# Patient Record
Sex: MALE | Race: BLACK OR AFRICAN AMERICAN | NOT HISPANIC OR LATINO | URBAN - METROPOLITAN AREA
[De-identification: names, ages, dates, MRNs, and addresses within clinical notes are randomized per-mention and may not be internally consistent; named-entity substitution may affect disease eponyms.]

---

## 2024-01-12 ENCOUNTER — EMERGENCY (EMERGENCY)
Facility: HOSPITAL | Age: 34
LOS: 1 days | Discharge: HOME | End: 2024-01-12
Attending: STUDENT IN AN ORGANIZED HEALTH CARE EDUCATION/TRAINING PROGRAM
Payer: SELF-PAY

## 2024-01-12 VITALS
OXYGEN SATURATION: 98 % | RESPIRATION RATE: 20 BRPM | TEMPERATURE: 98 F | DIASTOLIC BLOOD PRESSURE: 70 MMHG | WEIGHT: 140.21 LBS | SYSTOLIC BLOOD PRESSURE: 116 MMHG | HEART RATE: 72 BPM

## 2024-01-12 PROCEDURE — 99283 EMERGENCY DEPT VISIT LOW MDM: CPT

## 2024-01-12 PROCEDURE — 99053 MED SERV 10PM-8AM 24 HR FAC: CPT

## 2024-01-12 RX ORDER — ACETAMINOPHEN 500 MG
2 TABLET ORAL
Qty: 84 | Refills: 0
Start: 2024-01-12 | End: 2024-01-25

## 2024-01-12 RX ORDER — ACETAMINOPHEN 500 MG
975 TABLET ORAL ONCE
Refills: 0 | Status: COMPLETED | OUTPATIENT
Start: 2024-01-12 | End: 2024-01-12

## 2024-01-12 RX ADMIN — Medication 975 MILLIGRAM(S): at 05:58

## 2024-01-12 RX ADMIN — Medication 300 MILLIGRAM(S): at 05:58

## 2024-01-12 NOTE — ED PROVIDER NOTE - CLINICAL SUMMARY MEDICAL DECISION MAKING FREE TEXT BOX
32 y/o male with pmhx asthma presents to ED for dental pain x 1 week. Patient states pain is to the right side lower gums. Upon exam, male in no acute distress with tenderness to the right lower gum area, no abscess noted to the gums. Tolerating PO without difficulty, able to swallow without difficulty, afebrile. While in ED, patient given abx and tylenol. PCN allergy. Clindamycin sent to preferred pharmacy. 32 y/o male with pmhx asthma presents to ED for dental pain x 1 week. Patient states pain is to the right side lower gums. Upon exam, male in no acute distress with tenderness to the right lower gum area, no abscess noted to the gums. Tolerating PO without difficulty, able to swallow without difficulty, afebrile. While in ED, patient given abx and tylenol. PCN allergy. Clindamycin sent to preferred pharmacy. Patient recommended to follow up with dental clinic. Pt re-assessed at this time, feeling well, noting improvement in symptoms. VSS, tolerating PO intake, ambulating in ED with steady gait. All questions answered. Return precautions given. Stable for dc. Case and discharge discussed with Attending.

## 2024-01-12 NOTE — ED PROVIDER NOTE - OBJECTIVE STATEMENT
34 y/o male with pmhx asthma presents to ED for dental pain x 1 week. Patient states pain is to the right side lower gums. States there was minimal purulent discharge from the area this past week. No pain medications taken prior to arrival. Has not seen a dentist for this. No bleeding, no fever. Tolerating PO. He denies any difficulty swallowing, no CP/SOB, no throat pain, no ear pain, no vision changes, no headache, no dizziness, no nausea/vomiting. 32 y/o male with pmhx asthma presents to ED for dental pain x 1 week. Patient states pain is to the right side lower gums. States there was minimal purulent discharge from the area this past week. No pain medications taken prior to arrival. Has not seen a dentist for this. No bleeding, no fever. Tolerating PO. He denies any difficulty swallowing, no CP/SOB, no throat pain, no ear pain, no vision changes, no headache, no dizziness, no nausea/vomiting.

## 2024-01-12 NOTE — ED PROVIDER NOTE - PATIENT PORTAL LINK FT
You can access the FollowMyHealth Patient Portal offered by [facility] by registering at the following website: [email or web address]. By joining [employer]’s FollowMyHealth portal, you will also be able to view your health information using other applications (apps) compatible with our system. You can access the FollowMyHealth Patient Portal offered by [facility] by registering at the following website: [email or web address]. By joining [employer]’s FollowMyHealth portal, you will also be able to view your health information using other applications (apps) compatible with our system.

## 2024-01-12 NOTE — ED PROVIDER NOTE - NSFOLLOWUPCLINICS_GEN_ALL_ED_FT
[facility]  Dental  [address] [place] [address]  Phone: [phone]  Fax:      [facility]  Dental  [address] [place] [id number]  Phone: [phone]  Fax:

## 2024-01-12 NOTE — ED PROVIDER NOTE - ATTENDING APP SHARED VISIT CONTRIBUTION OF CARE
33y M w/ hx asthma, presents for dental pain. No fever. On exam with tenderness and gingival swelling to R lower molar area (s/p extraction). No sign of abscess, airway intact. Will cover for possible dental infection with clindamycin. Stable for discharge with outpatient dental f/u.

## 2024-01-12 NOTE — ED PROVIDER NOTE - NSFOLLOWUPINSTRUCTIONS_ED_ALL_ED_FT
Dental Pain    Dental pain (toothache) may be caused by many things including tooth decay (cavities or caries), abscess or infection, or trauma. If you were prescribed an antibiotic medicine, finish all of it even if you start to feel better. Rinsing your mouth with salt water or applying ice to the painful area of your face may help with the pain. Follow up with a dentist is important in ensuring good oral health and preventing the worsening of dental disease.    SEEK IMMEDIATE MEDICAL CARE IF YOU HAVE ANY OF THE FOLLOWING SYMPTOMS: unable to open your mouth, trouble breathing or swallowing, fever, or swelling of the face, neck, or jaw.    Please follow up with dental clinic within 3 days   Please take antibiotics as directed   Tylenol for pain

## 2024-01-12 NOTE — ED PROVIDER NOTE - PHYSICAL EXAMINATION
Gen: No acute distress, non toxic appearing male   HENT: NC/AT, Mucous membranes moist, Oropharynx without exudates, uvula midline, throat non-erythematous   Dental: (+) Tender to right lower dentition, No lesions to mouth/throat, Teeth inspected, poor dentition. No abscess noted to gums, no swelling or bleeding. No drooling or difficulty swallowing. Tolerating oral secretions.  Eyes: pink conjunctivae, EOMI, PERRL  CV: RRR, nl s1/s2  Resp: CTAB, normal rate and effort  Neuro: A&O x 3, ensorimotor intact without deficits   MSK: No spine or joint tenderness to palpation, Full ROM ext x 4  Skin: No rashes. intact and perfused  Ambulatory   Calm and cooperative

## 2024-01-12 NOTE — ED ADULT NURSE NOTE - NSFALLUNIVINTERV_ED_ALL_ED
Bed/Stretcher in lowest position, wheels locked, appropriate side rails in place/Call bell, personal items and telephone in reach/Instruct patient to call for assistance before getting out of bed/chair/stretcher/Non-slip footwear applied when patient is off stretcher/[place] to call system/Physically safe environment - no spills, clutter or unnecessary equipment/Purposeful proactive rounding/Room/bathroom lighting operational, light cord in reach Bed/Stretcher in lowest position, wheels locked, appropriate side rails in place/Call bell, personal items and telephone in reach/Instruct patient to call for assistance before getting out of bed/chair/stretcher/Non-slip footwear applied when patient is off stretcher/[place] to call system/Physically safe environment - no spills, clutter or unnecessary equipment/Purposeful proactive rounding/Room/bathroom lighting operational, light cord in reach

## 2024-01-12 NOTE — ED PROVIDER NOTE - NS ED ROS FT
Gen: denies fever, chills  HEENT: (+) Dental pain, denies visual changes, ear pain, nasal congestion, throat pain  Respiratory: denies COLMENARES, SOB, cough  Cardiovascular: denies chest pain, palpitations  GI: denies abdominal pain, no n/v/d  MSK: denies joint swelling/pain, back pain, neck pain  Neuro: denies headache, dizziness, weakness, numbness

## 2024-01-19 ENCOUNTER — EMERGENCY (EMERGENCY)
Facility: HOSPITAL | Age: 34
LOS: 1 days | Discharge: HOME | End: 2024-01-19
Attending: STUDENT IN AN ORGANIZED HEALTH CARE EDUCATION/TRAINING PROGRAM
Payer: SELF-PAY

## 2024-01-19 VITALS
SYSTOLIC BLOOD PRESSURE: 136 MMHG | OXYGEN SATURATION: 95 % | WEIGHT: 169.98 LBS | DIASTOLIC BLOOD PRESSURE: 92 MMHG | HEART RATE: 95 BPM | TEMPERATURE: 98 F | RESPIRATION RATE: 18 BRPM

## 2024-01-19 PROBLEM — J45.909 UNSPECIFIED ASTHMA, UNCOMPLICATED: Chronic | Status: ACTIVE | Noted: 2024-01-12

## 2024-01-19 PROCEDURE — 99053 MED SERV 10PM-8AM 24 HR FAC: CPT

## 2024-01-19 PROCEDURE — 99284 EMERGENCY DEPT VISIT MOD MDM: CPT

## 2024-01-19 PROCEDURE — 99283 EMERGENCY DEPT VISIT LOW MDM: CPT

## 2024-01-19 RX ORDER — HYDROXYZINE HCL 10 MG
50 TABLET ORAL ONCE
Refills: 0 | Status: COMPLETED | OUTPATIENT
Start: 2024-01-19 | End: 2024-01-19

## 2024-01-19 RX ADMIN — Medication 50 MILLIGRAM(S): at 04:46

## 2024-01-19 NOTE — ED PROVIDER NOTE - PATIENT PORTAL LINK FT
You can access the FollowMyHealth Patient Portal offered by [facility] by registering at the following website: [email or web address]. By joining [employer]’s FollowMyHealth portal, you will also be able to view your health information using other applications (apps) compatible with our system.

## 2024-01-19 NOTE — ED PROVIDER NOTE - CLINICAL SUMMARY MEDICAL DECISION MAKING FREE TEXT BOX
33 year old male w/PMHx of asthma presents to the ED with anxiety. Currently w/o complaints apart from feeling anxious. Vitals stable. Patient appears well, alert and oriented. Examination benign. Lungs clear bilaterally. Will give hydroxyzine for anxiety management.

## 2024-01-19 NOTE — ED PROVIDER NOTE - NSFOLLOWUPINSTRUCTIONS_ED_ALL_ED_FT
Anxiety    Generalized anxiety disorder (ASIA) is a mental disorder. It is defined as anxiety that is not necessarily related to specific events or activities or is out of proportion to said events. Symptoms include restlessness, fatigue, difficulty concentrations, irritability and difficulty concentrating. It may interfere with life functions, including relationships, work, and school. If you were started on a medication, make sure to take exactly as prescribed and follow up with a psychiatrist.    SEEK IMMEDIATE MEDICAL CARE IF YOU HAVE ANY OF THE FOLLOWING SYMPTOMS: thoughts about hurting killing yourself, thoughts about hurting or killing somebody else, hallucinations, or worsening depression.

## 2024-01-19 NOTE — ED PROVIDER NOTE - ATTENDING CONTRIBUTION TO CARE
33y M w/ hx asthma presents for anxiety. Pt says he smoked marijuana earlier tonight and thinks it might have been laced. Denies chest pain or SOB. No SI, HI, hallucinations. On exam, pt in no distress, heart RRR, lungs CTAB. AAOx4, no neuro deficits. Pt reassured. Treated with hydroxyzine. Medically stable for discharge.

## 2024-01-19 NOTE — ED ADULT NURSE NOTE - TEMPLATE LIST FOR HEAD TO TOE ASSESSMENT
General This was a shared visit with the DEIDRE. I reviewed and verified the documentation and independently performed the documented:

## 2024-01-19 NOTE — ED ADULT NURSE NOTE - NSFALLUNIVINTERV_ED_ALL_ED
Bed/Stretcher in lowest position, wheels locked, appropriate side rails in place/Call bell, personal items and telephone in reach/Instruct patient to call for assistance before getting out of bed/chair/stretcher/Non-slip footwear applied when patient is off stretcher/[place] to call system/Physically safe environment - no spills, clutter or unnecessary equipment/Purposeful proactive rounding/Room/bathroom lighting operational, light cord in reach

## 2024-01-19 NOTE — ED ADULT TRIAGE NOTE - CHIEF COMPLAINT QUOTE
smoked weed and drank alcohol approx 2 hours ago. now states "I feel anxious I think someone put something in my weed." denies cp, sob

## 2024-01-19 NOTE — ED PROVIDER NOTE - OBJECTIVE STATEMENT
33 year old male w/PMHx of asthma presents to the ED with anxiety. Patient states he smoked marijuana 2 hours ago, started feeling anxious 30 min ago. Pt denies any chest pain, dyspnea, fevers, n/v/d, abdominal pain, dysuria, headache, cough, congestion, sore throat, neck pain, back pain, weakness, numbness, tingling, dizziness, syncope, or other complaint. Known allergy to penicillin. Lives in a shelter and asked if he can be sent there.

## 2024-06-07 ENCOUNTER — EMERGENCY (EMERGENCY)
Facility: HOSPITAL | Age: 34
LOS: 1 days | Discharge: HOME | End: 2024-06-07
Attending: STUDENT IN AN ORGANIZED HEALTH CARE EDUCATION/TRAINING PROGRAM
Payer: SELF-PAY

## 2024-06-07 VITALS
DIASTOLIC BLOOD PRESSURE: 64 MMHG | HEART RATE: 86 BPM | RESPIRATION RATE: 16 BRPM | OXYGEN SATURATION: 99 % | TEMPERATURE: 98 F | SYSTOLIC BLOOD PRESSURE: 105 MMHG

## 2024-06-07 VITALS
WEIGHT: 210.1 LBS | DIASTOLIC BLOOD PRESSURE: 63 MMHG | HEART RATE: 114 BPM | SYSTOLIC BLOOD PRESSURE: 104 MMHG | RESPIRATION RATE: 18 BRPM | OXYGEN SATURATION: 95 % | TEMPERATURE: 98 F | HEIGHT: 73 IN

## 2024-06-07 LAB
ABO RH CONFIRMATION: SIGNIFICANT CHANGE UP
ANION GAP SERPL CALC-SCNC: 12 MMOL/L — SIGNIFICANT CHANGE UP (ref 5–17)
BASOPHILS # BLD AUTO: 0.03 K/UL — SIGNIFICANT CHANGE UP (ref 0–0.2)
BASOPHILS NFR BLD AUTO: 0.4 % — SIGNIFICANT CHANGE UP (ref 0–2)
BLD GP AB SCN SERPL QL: SIGNIFICANT CHANGE UP
BUN SERPL-MCNC: 14.6 MG/DL — SIGNIFICANT CHANGE UP (ref 8–20)
CALCIUM SERPL-MCNC: 8.1 MG/DL — LOW (ref 8.4–10.5)
CHLORIDE SERPL-SCNC: 101 MMOL/L — SIGNIFICANT CHANGE UP (ref 96–108)
CO2 SERPL-SCNC: 23 MMOL/L — SIGNIFICANT CHANGE UP (ref 22–29)
CREAT SERPL-MCNC: 0.89 MG/DL — SIGNIFICANT CHANGE UP (ref 0.5–1.3)
EGFR: 115 ML/MIN/1.73M2 — SIGNIFICANT CHANGE UP
EOSINOPHIL # BLD AUTO: 0.01 K/UL — SIGNIFICANT CHANGE UP (ref 0–0.5)
EOSINOPHIL NFR BLD AUTO: 0.1 % — SIGNIFICANT CHANGE UP (ref 0–6)
GLUCOSE SERPL-MCNC: 148 MG/DL — HIGH (ref 70–99)
HCT VFR BLD CALC: 33.3 % — LOW (ref 39–50)
HGB BLD-MCNC: 11.1 G/DL — LOW (ref 13–17)
IMM GRANULOCYTES NFR BLD AUTO: 0.5 % — SIGNIFICANT CHANGE UP (ref 0–0.9)
LYMPHOCYTES # BLD AUTO: 2.22 K/UL — SIGNIFICANT CHANGE UP (ref 1–3.3)
LYMPHOCYTES # BLD AUTO: 28.2 % — SIGNIFICANT CHANGE UP (ref 13–44)
MCHC RBC-ENTMCNC: 26.6 PG — LOW (ref 27–34)
MCHC RBC-ENTMCNC: 33.3 GM/DL — SIGNIFICANT CHANGE UP (ref 32–36)
MCV RBC AUTO: 79.9 FL — LOW (ref 80–100)
MONOCYTES # BLD AUTO: 0.65 K/UL — SIGNIFICANT CHANGE UP (ref 0–0.9)
MONOCYTES NFR BLD AUTO: 8.3 % — SIGNIFICANT CHANGE UP (ref 2–14)
NEUTROPHILS # BLD AUTO: 4.92 K/UL — SIGNIFICANT CHANGE UP (ref 1.8–7.4)
NEUTROPHILS NFR BLD AUTO: 62.5 % — SIGNIFICANT CHANGE UP (ref 43–77)
PLATELET # BLD AUTO: 253 K/UL — SIGNIFICANT CHANGE UP (ref 150–400)
POTASSIUM SERPL-MCNC: 3.7 MMOL/L — SIGNIFICANT CHANGE UP (ref 3.5–5.3)
POTASSIUM SERPL-SCNC: 3.7 MMOL/L — SIGNIFICANT CHANGE UP (ref 3.5–5.3)
RBC # BLD: 4.17 M/UL — LOW (ref 4.2–5.8)
RBC # FLD: 13.6 % — SIGNIFICANT CHANGE UP (ref 10.3–14.5)
SODIUM SERPL-SCNC: 136 MMOL/L — SIGNIFICANT CHANGE UP (ref 135–145)
WBC # BLD: 7.87 K/UL — SIGNIFICANT CHANGE UP (ref 3.8–10.5)
WBC # FLD AUTO: 7.87 K/UL — SIGNIFICANT CHANGE UP (ref 3.8–10.5)

## 2024-06-07 PROCEDURE — 86850 RBC ANTIBODY SCREEN: CPT

## 2024-06-07 PROCEDURE — 93010 ELECTROCARDIOGRAM REPORT: CPT

## 2024-06-07 PROCEDURE — 96375 TX/PRO/DX INJ NEW DRUG ADDON: CPT | Mod: XU

## 2024-06-07 PROCEDURE — 86900 BLOOD TYPING SEROLOGIC ABO: CPT

## 2024-06-07 PROCEDURE — 70450 CT HEAD/BRAIN W/O DYE: CPT | Mod: MC

## 2024-06-07 PROCEDURE — 93005 ELECTROCARDIOGRAM TRACING: CPT

## 2024-06-07 PROCEDURE — 99284 EMERGENCY DEPT VISIT MOD MDM: CPT

## 2024-06-07 PROCEDURE — 90471 IMMUNIZATION ADMIN: CPT

## 2024-06-07 PROCEDURE — 85025 COMPLETE CBC W/AUTO DIFF WBC: CPT

## 2024-06-07 PROCEDURE — 99285 EMERGENCY DEPT VISIT HI MDM: CPT | Mod: 25

## 2024-06-07 PROCEDURE — 82962 GLUCOSE BLOOD TEST: CPT

## 2024-06-07 PROCEDURE — 70450 CT HEAD/BRAIN W/O DYE: CPT | Mod: 26,MC

## 2024-06-07 PROCEDURE — 90715 TDAP VACCINE 7 YRS/> IM: CPT

## 2024-06-07 PROCEDURE — 73130 X-RAY EXAM OF HAND: CPT

## 2024-06-07 PROCEDURE — 73130 X-RAY EXAM OF HAND: CPT | Mod: 26,LT

## 2024-06-07 PROCEDURE — 80048 BASIC METABOLIC PNL TOTAL CA: CPT

## 2024-06-07 PROCEDURE — 12002 RPR S/N/AX/GEN/TRNK2.6-7.5CM: CPT

## 2024-06-07 PROCEDURE — 86901 BLOOD TYPING SEROLOGIC RH(D): CPT

## 2024-06-07 PROCEDURE — 36415 COLL VENOUS BLD VENIPUNCTURE: CPT

## 2024-06-07 PROCEDURE — 96374 THER/PROPH/DIAG INJ IV PUSH: CPT | Mod: XU

## 2024-06-07 RX ORDER — TETANUS TOXOID, REDUCED DIPHTHERIA TOXOID AND ACELLULAR PERTUSSIS VACCINE, ADSORBED 5; 2.5; 8; 8; 2.5 [IU]/.5ML; [IU]/.5ML; UG/.5ML; UG/.5ML; UG/.5ML
0.5 SUSPENSION INTRAMUSCULAR ONCE
Refills: 0 | Status: COMPLETED | OUTPATIENT
Start: 2024-06-07 | End: 2024-06-07

## 2024-06-07 RX ORDER — SODIUM CHLORIDE 9 MG/ML
1000 INJECTION INTRAMUSCULAR; INTRAVENOUS; SUBCUTANEOUS ONCE
Refills: 0 | Status: COMPLETED | OUTPATIENT
Start: 2024-06-07 | End: 2024-06-07

## 2024-06-07 RX ORDER — ACETAMINOPHEN 500 MG
1000 TABLET ORAL ONCE
Refills: 0 | Status: COMPLETED | OUTPATIENT
Start: 2024-06-07 | End: 2024-06-07

## 2024-06-07 RX ORDER — ONDANSETRON 8 MG/1
4 TABLET, FILM COATED ORAL ONCE
Refills: 0 | Status: COMPLETED | OUTPATIENT
Start: 2024-06-07 | End: 2024-06-07

## 2024-06-07 RX ADMIN — ONDANSETRON 4 MILLIGRAM(S): 8 TABLET, FILM COATED ORAL at 18:58

## 2024-06-07 RX ADMIN — SODIUM CHLORIDE 1000 MILLILITER(S): 9 INJECTION INTRAMUSCULAR; INTRAVENOUS; SUBCUTANEOUS at 19:59

## 2024-06-07 RX ADMIN — TETANUS TOXOID, REDUCED DIPHTHERIA TOXOID AND ACELLULAR PERTUSSIS VACCINE, ADSORBED 0.5 MILLILITER(S): 5; 2.5; 8; 8; 2.5 SUSPENSION INTRAMUSCULAR at 18:58

## 2024-06-07 RX ADMIN — Medication 400 MILLIGRAM(S): at 19:59

## 2024-06-07 NOTE — ED ADULT NURSE NOTE - OBJECTIVE STATEMENT
Assumed care of pt at 1900 in CC. Pt A&Ox4 c/o tripping and falling while playing basketball today, the pt has laceration to left hand, bleeding now controlled, pt was found in PRD vomiting/diaphoretic and bleeding from laceration, pt brought to CC and priority CT was called, pt denies CP/SOB/blood thinners/head strike, pt on cardiac monitor in NSR, pt resting comfortably showing no signs of respiratory distress or pain, the pt is calm and cooperative

## 2024-06-07 NOTE — ED PROVIDER NOTE - NSFOLLOWUPINSTRUCTIONS_ED_ALL_ED_FT
see your PCP/urgent care/emergency department for suture removal in 5 to 7 days.    Laceration    A laceration is a cut that goes through all of the layers of the skin and into the tissue that is right under the skin. Some lacerations heal on their own. Others need to be closed with skin adhesive strips, skin glue, stitches (sutures), or staples. Proper laceration care minimizes the risk of infection and helps the laceration to heal better.  If non-absorbable stitches or staples have been placed, they must be taken out within the time frame instructed by your healthcare provider.    SEEK IMMEDIATE MEDICAL CARE IF YOU HAVE ANY OF THE FOLLOWING SYMPTOMS: swelling around the wound, worsening pain, drainage from the wound, red streaking going away from your wound, inability to move finger or toe near the laceration, or discoloration of skin near the laceration.      Please follow-up with your primary care doctor.  Please call for an appointment in the next 48 hours but if you cannot follow-up with your primary care doctor please return to the Emergency Department for any urgent issues.    You were given a copy of the tests performed today.  Please bring the results with you and review them with your primary care doctor.    If you have any worsening of symptoms or any other concerns please return to the Emergency Department immediately.    Please continue taking your home medications as directed.    SEEK IMMEDIATE MEDICAL CARE IF YOU HAVE ANY OF THE FOLLOWING SYMPTOMS: severe headache, pain in your chest/abdomen/back, bleeding from your mouth or rectum, palpitations, shortness of breath, pain with breathing, seizure, confusion, or trouble walking. see your PCP/urgent care/emergency department for suture removal in 5 to 7 days.      follow up with hand clinic    Laceration    A laceration is a cut that goes through all of the layers of the skin and into the tissue that is right under the skin. Some lacerations heal on their own. Others need to be closed with skin adhesive strips, skin glue, stitches (sutures), or staples. Proper laceration care minimizes the risk of infection and helps the laceration to heal better.  If non-absorbable stitches or staples have been placed, they must be taken out within the time frame instructed by your healthcare provider.    SEEK IMMEDIATE MEDICAL CARE IF YOU HAVE ANY OF THE FOLLOWING SYMPTOMS: swelling around the wound, worsening pain, drainage from the wound, red streaking going away from your wound, inability to move finger or toe near the laceration, or discoloration of skin near the laceration.      Please follow-up with your primary care doctor.  Please call for an appointment in the next 48 hours but if you cannot follow-up with your primary care doctor please return to the Emergency Department for any urgent issues.    You were given a copy of the tests performed today.  Please bring the results with you and review them with your primary care doctor.    If you have any worsening of symptoms or any other concerns please return to the Emergency Department immediately.    Please continue taking your home medications as directed.    SEEK IMMEDIATE MEDICAL CARE IF YOU HAVE ANY OF THE FOLLOWING SYMPTOMS: severe headache, pain in your chest/abdomen/back, bleeding from your mouth or rectum, palpitations, shortness of breath, pain with breathing, seizure, confusion, or trouble walking.

## 2024-06-07 NOTE — ED PROVIDER NOTE - CARE PROVIDER_API CALL
Yun Blackman  Orthopaedic Surgery  [address] [place] [address]  Phone: [phone]  Fax: [phone]  Follow Up Time: 1-3 Days

## 2024-06-07 NOTE — ED PROVIDER NOTE - OBJECTIVE STATEMENT
33-year-old male with history of asthma and marijuana use presenting with laceration to the dorsal aspect of hand that occurred several hours prior to emergency department arrival.  Patient states that since laceration he has been bleeding a lot.  Patient denies any other injuries.  Patient is unsure if he struck his head but denies loss of consciousness.  Patient denies neck or back pain.  Patient noted to have syncopal episode while waiting to be evaluated in [facility] track while sitting in chair and moved to critical care. patient did not fall to ground or strike head.  Patient moving all extremities without focal deficit.  Denies headache, chest pain, shortness of breath, abdominal pain.

## 2024-06-07 NOTE — ED PROVIDER NOTE - CLINICAL SUMMARY MEDICAL DECISION MAKING FREE TEXT BOX
33-year-old male with laceration to the dorsal aspect of hand.  Syncope without head strike while in the emergency department.  Hemodynamically stable, neurovascularly intact, bleeding controlled.  No other signs of injuries.  Differential includes laceration, vasovagal, anemia. 33-year-old male with laceration to the dorsal aspect of hand.  Syncope without head strike while in the emergency department.  Hemodynamically stable, neurovascularly intact, bleeding controlled.  No other signs of injuries.  Differential includes laceration, vasovagal, anemia. Labs and imaging independently reviewed and interpreted with no acute pathology noted. Significant improvement during ED stay after IVF. There are no signs of ischemia, WPW, ARVD, long or short QT, HOCM or brugada on the Emergency Department EKG. Tolerated suture well, antibiotics started. Patient to follow with hand. 33-year-old male with laceration to the dorsal aspect of hand.  Syncope without head strike while in the emergency department.  Hemodynamically stable, neurovascularly intact, bleeding controlled.  No other signs of injuries.  Differential includes laceration, vasovagal, anemia. Labs and imaging independently reviewed and interpreted with no acute pathology noted. Significant improvement during ED stay after IVF. There are no signs of ischemia, WPW, ARVD, long or short QT, HOCM or brugada on the Emergency Department EKG. Tolerated suture well, remains neurovascularly intact, antibiotics started. Patient to follow with hand.

## 2024-06-07 NOTE — CHART NOTE - NSCHARTNOTEFT_GEN_A_CORE
Smith: p/c from RN (Ileana) to inform worker pt ready for d/c ,needs transportation to return to his shelter . Worker spoke with pt, states he was placed in a shelter at [address] [place] [address] . Worker called DSS spoke with Harpreet , pt can return and has mcaid [id number]. Co worker Smith: p/c from RN (Ileana) to inform worker pt ready for d/c ,needs transportation to return to his shelter . Worker spoke with pt, states he was placed in a shelter at [address] [place] [address] . Worker called DSS spoke with Harpreet , pt can return and has mcaid [id number]. Co worker (Devorah) called [employer] for mcaid taxi ,mcaid inactive. Worker called DSS (Krystle) pt from a room and board housing , unable to provide taxi for him unsure if pt can get in at this time . Taxi voucher will be $70 if approved. SW to follow. Smith: p/c from RN (Ileana) to inform worker pt ready for d/c ,needs transportation to return to his shelter . Worker spoke with pt, states he was placed in a shelter at [address] [place] [address] . Worker called DSS spoke with Harpreet , pt can return and has mcaid [id number]. Co worker (Devorah) called [employer] for mcaid taxi ,mcaid inactive. Worker called DSS (Krystle) pt from a room and board housing , unable to provide taxi for him. Taxi voucher will be $70 if approved. SW to follow.  22:49 Voucher provided , Keyur gilmani called ETA 5 min. /pt informed ,understood awaiting in the ED lobby .

## 2024-06-07 NOTE — ED ADULT NURSE REASSESSMENT NOTE - NS ED NURSE REASSESS COMMENT FT1
patient received from AM RN care assumed  patient resting in hospital stretcher alert and oriented x4, breathing even and unlabored. patient denies lightheadedness, dizziness, and sob  patient endorses pain to right hand

## 2024-06-07 NOTE — ED PROVIDER NOTE - PHYSICAL EXAMINATION
General: Well appearing in no acute distress, alert and cooperative  Head: Normocephalic, atraumatic  Eyes: PERRLA, no conjunctival injection, no scleral icterus, EOMI  ENMT: Atraumatic external nose and ears  Neck: Soft and supple, full ROM without pain, no midline tenderness  Cardiac: Regular rate and regular rhythm, no murmurs, peripheral pulses 2+ and symmetric in all extremities, no LE edema.  Resp: Unlabored respiratory effort, lungs CTAB  Abd: Soft, non-tender, non-distended  MSK: Spine midline and non-tender  Skin: Warm and dry  Neuro: AO x 3, moves all extremities symmetrically, Motor strength and sensation grossly intact General: Well appearing in no acute distress, alert and cooperative  Head: Normocephalic, atraumatic  Eyes: PERRLA, no conjunctival injection, no scleral icterus, EOMI  ENMT: Atraumatic external nose and ears  Neck: Soft and supple, full ROM without pain, no midline tenderness  Cardiac: Regular rate and regular rhythm, no murmurs, peripheral pulses 2+ and symmetric in all extremities, no LE edema.  Resp: Unlabored respiratory effort, lungs CTAB  Abd: Soft, non-tender, non-distended  MSK: Spine midline and non-tender  Skin: Warm and dry, 5cm laceration to left dorsal hand  Neuro: AO x 3, moves all extremities symmetrically, Motor strength and sensation grossly intact

## 2024-06-07 NOTE — ED ADULT NURSE NOTE - NSFALLRISKINTERV_ED_ALL_ED

## 2024-06-07 NOTE — ED PROVIDER NOTE - ATTENDING CONTRIBUTION TO CARE
33-year-old male presenting for evaluation of laceration to the dorsal aspect of the left hand.  Patient had been moved to the main/critical care after having had an event where he fell out of a chair and track.  Did not strike his head or fall to the ground, sounds as though he may have dozed off.  Is alert and oriented currently, no complaints other than hand laceration. Lac is linear, approx 5cm, no active bleeding. Resident cleaned / closed laceration, adacel given lac, abx prophylactic coverage given depth, stable for outpt follow up with hand w. return precautions for any sig change or worsening

## 2024-06-07 NOTE — ED PROVIDER NOTE - PATIENT PORTAL LINK FT
You can access the FollowMyHealth Patient Portal offered by [facility] by registering at the following website: [email or web address]. By joining [employer]’s FollowMyHealth portal, you will also be able to view your health information using other applications (apps) compatible with our system. negative...

## 2024-06-07 NOTE — ED ADULT TRIAGE NOTE - CHIEF COMPLAINT QUOTE
pt reports tripping and fall while playing basket ball. denies loc, blood thinners or head strike. laceration noted on left hand.

## 2024-06-21 ENCOUNTER — EMERGENCY (EMERGENCY)
Facility: HOSPITAL | Age: 34
LOS: 1 days | Discharge: HOME | End: 2024-06-21
Attending: EMERGENCY MEDICINE
Payer: SELF-PAY

## 2024-06-21 VITALS
HEIGHT: 73 IN | HEART RATE: 67 BPM | SYSTOLIC BLOOD PRESSURE: 118 MMHG | RESPIRATION RATE: 18 BRPM | TEMPERATURE: 98 F | DIASTOLIC BLOOD PRESSURE: 75 MMHG | OXYGEN SATURATION: 99 % | WEIGHT: 184.97 LBS

## 2024-06-21 VITALS
TEMPERATURE: 98 F | OXYGEN SATURATION: 97 % | SYSTOLIC BLOOD PRESSURE: 109 MMHG | DIASTOLIC BLOOD PRESSURE: 71 MMHG | HEART RATE: 57 BPM | RESPIRATION RATE: 19 BRPM

## 2024-06-21 LAB
ALBUMIN SERPL ELPH-MCNC: 3.5 G/DL — SIGNIFICANT CHANGE UP (ref 3.3–5.2)
ALP SERPL-CCNC: 85 U/L — SIGNIFICANT CHANGE UP (ref 40–120)
ALT FLD-CCNC: 18 U/L — SIGNIFICANT CHANGE UP
ANION GAP SERPL CALC-SCNC: 7 MMOL/L — SIGNIFICANT CHANGE UP (ref 5–17)
APTT BLD: 25.1 SEC — SIGNIFICANT CHANGE UP (ref 24.5–35.6)
AST SERPL-CCNC: 32 U/L — SIGNIFICANT CHANGE UP
BASOPHILS # BLD AUTO: 0.04 K/UL — SIGNIFICANT CHANGE UP (ref 0–0.2)
BASOPHILS NFR BLD AUTO: 0.6 % — SIGNIFICANT CHANGE UP (ref 0–2)
BILIRUB SERPL-MCNC: 0.4 MG/DL — SIGNIFICANT CHANGE UP (ref 0.4–2)
BUN SERPL-MCNC: 11.7 MG/DL — SIGNIFICANT CHANGE UP (ref 8–20)
CALCIUM SERPL-MCNC: 8.5 MG/DL — SIGNIFICANT CHANGE UP (ref 8.4–10.5)
CHLORIDE SERPL-SCNC: 101 MMOL/L — SIGNIFICANT CHANGE UP (ref 96–108)
CO2 SERPL-SCNC: 29 MMOL/L — SIGNIFICANT CHANGE UP (ref 22–29)
CREAT SERPL-MCNC: 0.78 MG/DL — SIGNIFICANT CHANGE UP (ref 0.5–1.3)
CRP SERPL-MCNC: 5 MG/L — HIGH
EGFR: 119 ML/MIN/1.73M2 — SIGNIFICANT CHANGE UP
EOSINOPHIL # BLD AUTO: 0.13 K/UL — SIGNIFICANT CHANGE UP (ref 0–0.5)
EOSINOPHIL NFR BLD AUTO: 1.8 % — SIGNIFICANT CHANGE UP (ref 0–6)
ERYTHROCYTE [SEDIMENTATION RATE] IN BLOOD: 12 MM/HR — SIGNIFICANT CHANGE UP (ref 0–15)
GLUCOSE SERPL-MCNC: 79 MG/DL — SIGNIFICANT CHANGE UP (ref 70–99)
HCT VFR BLD CALC: 32.2 % — LOW (ref 39–50)
HGB BLD-MCNC: 10.1 G/DL — LOW (ref 13–17)
IMM GRANULOCYTES NFR BLD AUTO: 0.1 % — SIGNIFICANT CHANGE UP (ref 0–0.9)
INR BLD: 0.91 RATIO — SIGNIFICANT CHANGE UP (ref 0.85–1.18)
LYMPHOCYTES # BLD AUTO: 1.79 K/UL — SIGNIFICANT CHANGE UP (ref 1–3.3)
LYMPHOCYTES # BLD AUTO: 25.2 % — SIGNIFICANT CHANGE UP (ref 13–44)
MCHC RBC-ENTMCNC: 26.6 PG — LOW (ref 27–34)
MCHC RBC-ENTMCNC: 31.4 GM/DL — LOW (ref 32–36)
MCV RBC AUTO: 85 FL — SIGNIFICANT CHANGE UP (ref 80–100)
MONOCYTES # BLD AUTO: 0.57 K/UL — SIGNIFICANT CHANGE UP (ref 0–0.9)
MONOCYTES NFR BLD AUTO: 8 % — SIGNIFICANT CHANGE UP (ref 2–14)
NEUTROPHILS # BLD AUTO: 4.55 K/UL — SIGNIFICANT CHANGE UP (ref 1.8–7.4)
NEUTROPHILS NFR BLD AUTO: 64.3 % — SIGNIFICANT CHANGE UP (ref 43–77)
PLATELET # BLD AUTO: 284 K/UL — SIGNIFICANT CHANGE UP (ref 150–400)
POTASSIUM SERPL-MCNC: 4.3 MMOL/L — SIGNIFICANT CHANGE UP (ref 3.5–5.3)
POTASSIUM SERPL-SCNC: 4.3 MMOL/L — SIGNIFICANT CHANGE UP (ref 3.5–5.3)
PROT SERPL-MCNC: 6.7 G/DL — SIGNIFICANT CHANGE UP (ref 6.6–8.7)
PROTHROM AB SERPL-ACNC: 10.1 SEC — SIGNIFICANT CHANGE UP (ref 9.5–13)
RBC # BLD: 3.79 M/UL — LOW (ref 4.2–5.8)
RBC # FLD: 15.7 % — HIGH (ref 10.3–14.5)
SODIUM SERPL-SCNC: 137 MMOL/L — SIGNIFICANT CHANGE UP (ref 135–145)
WBC # BLD: 7.09 K/UL — SIGNIFICANT CHANGE UP (ref 3.8–10.5)
WBC # FLD AUTO: 7.09 K/UL — SIGNIFICANT CHANGE UP (ref 3.8–10.5)

## 2024-06-21 PROCEDURE — 99053 MED SERV 10PM-8AM 24 HR FAC: CPT

## 2024-06-21 PROCEDURE — 85730 THROMBOPLASTIN TIME PARTIAL: CPT

## 2024-06-21 PROCEDURE — 85025 COMPLETE CBC W/AUTO DIFF WBC: CPT

## 2024-06-21 PROCEDURE — 86140 C-REACTIVE PROTEIN: CPT

## 2024-06-21 PROCEDURE — 36415 COLL VENOUS BLD VENIPUNCTURE: CPT

## 2024-06-21 PROCEDURE — 85652 RBC SED RATE AUTOMATED: CPT

## 2024-06-21 PROCEDURE — 85610 PROTHROMBIN TIME: CPT

## 2024-06-21 PROCEDURE — 96374 THER/PROPH/DIAG INJ IV PUSH: CPT

## 2024-06-21 PROCEDURE — 99285 EMERGENCY DEPT VISIT HI MDM: CPT

## 2024-06-21 PROCEDURE — 73130 X-RAY EXAM OF HAND: CPT

## 2024-06-21 PROCEDURE — 73130 X-RAY EXAM OF HAND: CPT | Mod: 26,LT

## 2024-06-21 PROCEDURE — 99284 EMERGENCY DEPT VISIT MOD MDM: CPT | Mod: 25

## 2024-06-21 PROCEDURE — 80053 COMPREHEN METABOLIC PANEL: CPT

## 2024-06-21 PROCEDURE — 96375 TX/PRO/DX INJ NEW DRUG ADDON: CPT

## 2024-06-21 RX ORDER — ACETAMINOPHEN 500 MG
1000 TABLET ORAL ONCE
Refills: 0 | Status: COMPLETED | OUTPATIENT
Start: 2024-06-21 | End: 2024-06-21

## 2024-06-21 RX ADMIN — Medication 400 MILLIGRAM(S): at 09:20

## 2024-06-21 RX ADMIN — Medication 100 MILLIGRAM(S): at 09:21

## 2024-06-21 NOTE — ED PROVIDER NOTE - PATIENT PORTAL LINK FT
You can access the FollowMyHealth Patient Portal offered by [facility] by registering at the following website: [email or web address]. By joining [employer]’s FollowMyHealth portal, you will also be able to view your health information using other applications (apps) compatible with our system.

## 2024-06-21 NOTE — ED PROVIDER NOTE - PROGRESS NOTE DETAILS
Labs and imaging reviewed. Given dose of IV Clindamycin here. Sent course of Clinda to [facility] here for patient to  prior to leaving. Stable for dc with PMD f/u and return precautions. Will remove sutures prior to discharge as they were placed two weeks ago and wound looks clean. - Cheli

## 2024-06-21 NOTE — ED PROVIDER NOTE - PHYSICAL EXAMINATION
General: well appearing, NAD  Head: NC, AT  EENT: no scleral icterus  Cardiac: no lower extremity edema  Respiratory: no respiratory distress   Abdomen: ND  MSK/Vascular: soft compartments, warm extremities  Skin: Swelling and ttp to dorsal aspect of L hand  Neuro: grossly intact  Psych: calm, cooperative

## 2024-06-21 NOTE — ED PROVIDER NOTE - CLINICAL SUMMARY MEDICAL DECISION MAKING FREE TEXT BOX
32 y/o M with PMHx asthma who presents to the ED for L hand pain and swelling gradually worsening since his lac repair here two weeks ago. Will obtain basic labs, coags, esr/crp, L hand XR, tx with IV abx, Tylenol for pain, reassess.

## 2024-06-21 NOTE — ED ADULT NURSE NOTE - OBJECTIVE STATEMENT
Pt A&Ox4, NAD. Pt presents to the ED with complaints of pain and swelling to suture site on left hand. Breathing even and unlabored.

## 2024-06-21 NOTE — ED ADULT TRIAGE NOTE - CHIEF COMPLAINT QUOTE
Pt BIBA from home c/o increased left hand pain. Pt had sutures placed 7 days ago and was prescribed antibiotics but never picked them up. Returns today with swelling and redness to hand.

## 2024-06-21 NOTE — ED PROVIDER NOTE - NS ED ROS FT
Constitutional: no fever, no chills  Head: NC, AT  Eyes: no redness  ENMT: no nasal congestion/drainage  CV: no chest pain, no edema  Resp: no cough, no dyspnea  GI: no abdominal pain, no nausea, no vomiting, no diarrhea  : no dysuria  Skin: L hand swelling  Neuro: no LOC, no headache, no sensory deficits

## 2024-06-21 NOTE — ED PROVIDER NOTE - ATTENDING CONTRIBUTION TO CARE
I, Juan Carlos Stauffer MD, performed the initial face to face bedside interview with this patient regarding history of present illness, review of symptoms and relevant past medical, social and family history.  I completed an independent physical examination.  I was the initial provider who evaluated this patient. I have signed out the follow up of any pending tests (i.e. labs, radiological studies) to the resident.  I have communicated the patient’s plan of care and disposition with the resident.    General: well appearing, NAD  Head: NC, AT  EENT: no scleral icterus  Cardiac: no lower extremity edema  Respiratory: no respiratory distress   Abdomen: ND  MSK/Vascular: soft compartments, warm extremities  Skin: Swelling and ttp to dorsal aspect of L hand  Neuro: grossly intact  Psych: calm, cooperative

## 2024-06-21 NOTE — ED PROVIDER NOTE - OBJECTIVE STATEMENT
32 y/o M with PMHx asthma who presents to the ED for L hand pain and swelling gradually worsening since his lac repair here two weeks ago. States that he was supposed to  antibiotics but never did due to insurance issues. Denies any fevers or chills or drainage from wound. Otherwise denies any other complaints at this time.

## 2024-06-22 ENCOUNTER — EMERGENCY (EMERGENCY)
Facility: HOSPITAL | Age: 34
LOS: 1 days | Discharge: HOME | End: 2024-06-22
Attending: EMERGENCY MEDICINE
Payer: SELF-PAY

## 2024-06-22 VITALS
HEART RATE: 64 BPM | WEIGHT: 175.05 LBS | DIASTOLIC BLOOD PRESSURE: 77 MMHG | TEMPERATURE: 98 F | OXYGEN SATURATION: 97 % | HEIGHT: 73 IN | SYSTOLIC BLOOD PRESSURE: 117 MMHG | RESPIRATION RATE: 18 BRPM

## 2024-06-22 PROCEDURE — 99283 EMERGENCY DEPT VISIT LOW MDM: CPT

## 2024-06-22 PROCEDURE — 99053 MED SERV 10PM-8AM 24 HR FAC: CPT

## 2024-06-22 PROCEDURE — 99284 EMERGENCY DEPT VISIT MOD MDM: CPT

## 2024-06-22 NOTE — CHART NOTE - NSCHARTNOTEFT_GEN_A_CORE
DAMIAN Note: DAMIAN was informed by PA pt requesting to speak to SW about housing. DAMIAN met with pt at bedside - pt reports they want to be evaluated for inpt substance use tx. Pt reports they drink a bottle of vodka daily and denies any substance use. DAMIAN asked pt if they have any insurance - pt reports they're not sure and provided SW their SSN. Pt reports they are undomiciled at this time and have no phone. Pt reports last address listed is their last legal address which they had to leave stating "Social security is not paying for anymore." DAMIAN advised pt there are a few programs that could provide services however they may be closed for the weekend. DAMIAN reached out to  [facility] [[phone]] -  staff informed DAMIAN their admissions office is closed during the weekend. DAMIAN messaged financial office on teams requesting if they can find if pt has active insurance. DAMIAN will follow.

## 2024-06-22 NOTE — ED PROVIDER NOTE - OBJECTIVE STATEMENT
33-year-old male presents ED complaining of persistent left hand pain since his initial laceration.  Patient was seen in the ED yesterday and placed on antibiotics.  Patient states that he was unable to obtain the antibiotics secondary to financial reasons.  Patient with no new complaints at this time.

## 2024-06-22 NOTE — ED ADULT TRIAGE NOTE - CHIEF COMPLAINT QUOTE
Pt seen at [facility] 10 days ago received stitches to left hand, pt has not taken prescribed antibiotics now endorses pain and swelling to left hand. Denies fevers/chills

## 2024-06-22 NOTE — ED PROVIDER NOTE - PATIENT PORTAL LINK FT
You can access the FollowMyHealth Patient Portal offered by [facility] by registering at the following website: [email or web address]. By joining [employer]’s FollowMyHealth portal, you will also be able to view your health information using other applications (apps) compatible with our system.

## 2024-06-22 NOTE — ED PROVIDER NOTE - CLINICAL SUMMARY MEDICAL DECISION MAKING FREE TEXT BOX
33-year-old male presents ED complaining of persistent left hand pain since his initial laceration.  Patient was seen in the ED yesterday and placed on antibiotics.  Patient states that he was unable to obtain the antibiotics secondary to financial reasons.     Antibiotics obtained from [facility] pharmacy and given to patient.  Patient instructed to complete full course of antibiotics.  Social work consult on patient for rehab,  states that she is unable to get patient rehab during weekend secondary to no insurance.   states that patient walked out before discharge instructions and papers.

## 2024-06-22 NOTE — ED ADULT NURSE NOTE - CHIEF COMPLAINT QUOTE
Pt seen at [facility] 10 days ago received stitches to left hand, pt has not taken prescribed antibiotics now endorses pain and swelling to left hand. Denies fevers/chills

## 2024-06-22 NOTE — ED PROVIDER NOTE - SKIN, MLM
+ healing laceration of the dorsal left hand with no significant erythema/swelling/warmth noted. No collection palpated. No active discharge. No wound dehiscence.

## 2024-06-26 ENCOUNTER — EMERGENCY (EMERGENCY)
Facility: HOSPITAL | Age: 34
LOS: 1 days | Discharge: HOME | End: 2024-06-26
Attending: EMERGENCY MEDICINE
Payer: SELF-PAY

## 2024-06-26 VITALS
HEART RATE: 61 BPM | DIASTOLIC BLOOD PRESSURE: 70 MMHG | TEMPERATURE: 98 F | RESPIRATION RATE: 18 BRPM | OXYGEN SATURATION: 100 % | SYSTOLIC BLOOD PRESSURE: 115 MMHG

## 2024-06-26 VITALS
HEIGHT: 76 IN | TEMPERATURE: 98 F | HEART RATE: 79 BPM | SYSTOLIC BLOOD PRESSURE: 119 MMHG | OXYGEN SATURATION: 96 % | DIASTOLIC BLOOD PRESSURE: 73 MMHG | WEIGHT: 175.05 LBS | RESPIRATION RATE: 18 BRPM

## 2024-06-26 DIAGNOSIS — F32.A DEPRESSION, UNSPECIFIED: ICD-10-CM

## 2024-06-26 DIAGNOSIS — F10.90 ALCOHOL USE, UNSPECIFIED, UNCOMPLICATED: ICD-10-CM

## 2024-06-26 DIAGNOSIS — F14.10 COCAINE ABUSE, UNCOMPLICATED: ICD-10-CM

## 2024-06-26 LAB
ALBUMIN SERPL ELPH-MCNC: 3.9 G/DL — SIGNIFICANT CHANGE UP (ref 3.3–5.2)
ALP SERPL-CCNC: 90 U/L — SIGNIFICANT CHANGE UP (ref 40–120)
ALT FLD-CCNC: 25 U/L — SIGNIFICANT CHANGE UP
AMPHET UR-MCNC: NEGATIVE — SIGNIFICANT CHANGE UP
ANION GAP SERPL CALC-SCNC: 11 MMOL/L — SIGNIFICANT CHANGE UP (ref 5–17)
APAP SERPL-MCNC: <3 UG/ML — LOW (ref 10–26)
APPEARANCE UR: CLEAR — SIGNIFICANT CHANGE UP
AST SERPL-CCNC: 37 U/L — SIGNIFICANT CHANGE UP
BARBITURATES UR SCN-MCNC: NEGATIVE — SIGNIFICANT CHANGE UP
BASOPHILS # BLD AUTO: 0.05 K/UL — SIGNIFICANT CHANGE UP (ref 0–0.2)
BASOPHILS NFR BLD AUTO: 1 % — SIGNIFICANT CHANGE UP (ref 0–2)
BENZODIAZ UR-MCNC: NEGATIVE — SIGNIFICANT CHANGE UP
BILIRUB SERPL-MCNC: 0.2 MG/DL — LOW (ref 0.4–2)
BILIRUB UR-MCNC: NEGATIVE — SIGNIFICANT CHANGE UP
BUN SERPL-MCNC: 9.4 MG/DL — SIGNIFICANT CHANGE UP (ref 8–20)
CALCIUM SERPL-MCNC: 8.6 MG/DL — SIGNIFICANT CHANGE UP (ref 8.4–10.5)
CHLORIDE SERPL-SCNC: 101 MMOL/L — SIGNIFICANT CHANGE UP (ref 96–108)
CO2 SERPL-SCNC: 25 MMOL/L — SIGNIFICANT CHANGE UP (ref 22–29)
COCAINE METAB.OTHER UR-MCNC: POSITIVE
COLOR SPEC: YELLOW — SIGNIFICANT CHANGE UP
CREAT SERPL-MCNC: 0.86 MG/DL — SIGNIFICANT CHANGE UP (ref 0.5–1.3)
DIFF PNL FLD: NEGATIVE — SIGNIFICANT CHANGE UP
EGFR: 116 ML/MIN/1.73M2 — SIGNIFICANT CHANGE UP
EOSINOPHIL # BLD AUTO: 0.06 K/UL — SIGNIFICANT CHANGE UP (ref 0–0.5)
EOSINOPHIL NFR BLD AUTO: 1.2 % — SIGNIFICANT CHANGE UP (ref 0–6)
ETHANOL SERPL-MCNC: 20 MG/DL — HIGH (ref 0–9)
FENTANYL UR QL SCN: NEGATIVE — SIGNIFICANT CHANGE UP
FLUAV AG NPH QL: SIGNIFICANT CHANGE UP
FLUBV AG NPH QL: SIGNIFICANT CHANGE UP
GLUCOSE SERPL-MCNC: 71 MG/DL — SIGNIFICANT CHANGE UP (ref 70–99)
GLUCOSE UR QL: NEGATIVE MG/DL — SIGNIFICANT CHANGE UP
HCT VFR BLD CALC: 33.9 % — LOW (ref 39–50)
HGB BLD-MCNC: 10.6 G/DL — LOW (ref 13–17)
IMM GRANULOCYTES NFR BLD AUTO: 0.4 % — SIGNIFICANT CHANGE UP (ref 0–0.9)
KETONES UR-MCNC: NEGATIVE MG/DL — SIGNIFICANT CHANGE UP
LEUKOCYTE ESTERASE UR-ACNC: NEGATIVE — SIGNIFICANT CHANGE UP
LYMPHOCYTES # BLD AUTO: 1.74 K/UL — SIGNIFICANT CHANGE UP (ref 1–3.3)
LYMPHOCYTES # BLD AUTO: 34.5 % — SIGNIFICANT CHANGE UP (ref 13–44)
MCHC RBC-ENTMCNC: 26.4 PG — LOW (ref 27–34)
MCHC RBC-ENTMCNC: 31.3 GM/DL — LOW (ref 32–36)
MCV RBC AUTO: 84.3 FL — SIGNIFICANT CHANGE UP (ref 80–100)
METHADONE UR-MCNC: NEGATIVE — SIGNIFICANT CHANGE UP
MONOCYTES # BLD AUTO: 0.42 K/UL — SIGNIFICANT CHANGE UP (ref 0–0.9)
MONOCYTES NFR BLD AUTO: 8.3 % — SIGNIFICANT CHANGE UP (ref 2–14)
NEUTROPHILS # BLD AUTO: 2.75 K/UL — SIGNIFICANT CHANGE UP (ref 1.8–7.4)
NEUTROPHILS NFR BLD AUTO: 54.6 % — SIGNIFICANT CHANGE UP (ref 43–77)
NITRITE UR-MCNC: NEGATIVE — SIGNIFICANT CHANGE UP
OPIATES UR-MCNC: NEGATIVE — SIGNIFICANT CHANGE UP
PCP SPEC-MCNC: SIGNIFICANT CHANGE UP
PCP UR-MCNC: NEGATIVE — SIGNIFICANT CHANGE UP
PH UR: 5.5 — SIGNIFICANT CHANGE UP (ref 5–8)
PLATELET # BLD AUTO: 263 K/UL — SIGNIFICANT CHANGE UP (ref 150–400)
POTASSIUM SERPL-MCNC: 4 MMOL/L — SIGNIFICANT CHANGE UP (ref 3.5–5.3)
POTASSIUM SERPL-SCNC: 4 MMOL/L — SIGNIFICANT CHANGE UP (ref 3.5–5.3)
PROT SERPL-MCNC: 7.5 G/DL — SIGNIFICANT CHANGE UP (ref 6.6–8.7)
PROT UR-MCNC: NEGATIVE MG/DL — SIGNIFICANT CHANGE UP
RBC # BLD: 4.02 M/UL — LOW (ref 4.2–5.8)
RBC # FLD: 14.8 % — HIGH (ref 10.3–14.5)
RSV RNA NPH QL NAA+NON-PROBE: SIGNIFICANT CHANGE UP
SALICYLATES SERPL-MCNC: <0.6 MG/DL — LOW (ref 10–20)
SARS-COV-2 RNA SPEC QL NAA+PROBE: SIGNIFICANT CHANGE UP
SODIUM SERPL-SCNC: 137 MMOL/L — SIGNIFICANT CHANGE UP (ref 135–145)
SP GR SPEC: 1.02 — SIGNIFICANT CHANGE UP (ref 1–1.03)
THC UR QL: POSITIVE
UROBILINOGEN FLD QL: 1 MG/DL — SIGNIFICANT CHANGE UP (ref 0.2–1)
WBC # BLD: 5.04 K/UL — SIGNIFICANT CHANGE UP (ref 3.8–10.5)
WBC # FLD AUTO: 5.04 K/UL — SIGNIFICANT CHANGE UP (ref 3.8–10.5)

## 2024-06-26 PROCEDURE — 85025 COMPLETE CBC W/AUTO DIFF WBC: CPT

## 2024-06-26 PROCEDURE — 99285 EMERGENCY DEPT VISIT HI MDM: CPT

## 2024-06-26 PROCEDURE — 99284 EMERGENCY DEPT VISIT MOD MDM: CPT

## 2024-06-26 PROCEDURE — 81003 URINALYSIS AUTO W/O SCOPE: CPT

## 2024-06-26 PROCEDURE — 80307 DRUG TEST PRSMV CHEM ANLYZR: CPT

## 2024-06-26 PROCEDURE — 80053 COMPREHEN METABOLIC PANEL: CPT

## 2024-06-26 PROCEDURE — 90792 PSYCH DIAG EVAL W/MED SRVCS: CPT

## 2024-06-26 PROCEDURE — 87637 SARSCOV2&INF A&B&RSV AMP PRB: CPT

## 2024-06-26 PROCEDURE — 36415 COLL VENOUS BLD VENIPUNCTURE: CPT

## 2024-10-15 NOTE — ED ADULT NURSE NOTE - NSFALLRISKASMTTYPE_ED_ALL_ED
Problem: Pain - Adult  Goal: Verbalizes/displays adequate comfort level or baseline comfort level  10/15/2024 1344 by Dasia Rankin RN  Outcome: Met  10/15/2024 1015 by Dasia Rankin RN  Outcome: Progressing     Problem: Safety - Adult  Goal: Free from fall injury  10/15/2024 1344 by Dasia Rankin RN  Outcome: Met  10/15/2024 1015 by Dasia Rankin RN  Outcome: Progressing     Problem: Discharge Planning  Goal: Discharge to home or other facility with appropriate resources  10/15/2024 1344 by Dasia Rankin RN  Outcome: Met  10/15/2024 1015 by Dasia Rankin RN  Outcome: Progressing     Problem: Chronic Conditions and Co-morbidities  Goal: Patient's chronic conditions and co-morbidity symptoms are monitored and maintained or improved  10/15/2024 1344 by Dasia Rankin RN  Outcome: Met  10/15/2024 1015 by Dasia Rankin RN  Outcome: Progressing     Problem: Pain  Goal: Takes deep breaths with improved pain control throughout the shift  10/15/2024 1344 by Dasia Rankin RN  Outcome: Met  10/15/2024 1015 by Dasia Rankin RN  Outcome: Progressing  Goal: Turns in bed with improved pain control throughout the shift  10/15/2024 1344 by Dasia Rankin RN  Outcome: Met  10/15/2024 1015 by Dasia Rankin RN  Outcome: Progressing  Goal: Walks with improved pain control throughout the shift  10/15/2024 1344 by Dasia Rankin RN  Outcome: Met  10/15/2024 1015 by Dasia Rankin RN  Outcome: Progressing  Goal: Performs ADL's with improved pain control throughout shift  10/15/2024 1344 by Dasia Rankin RN  Outcome: Met  10/15/2024 1015 by Dasia Rankin RN  Outcome: Progressing  Goal: Participates in PT with improved pain control throughout the shift  10/15/2024 1344 by Dasia Rankin RN  Outcome: Met  10/15/2024 1015 by Dasia Rankin RN  Outcome: Progressing  Goal: Free from opioid side effects throughout the shift  10/15/2024 1344 by Dasia Rankin RN  Outcome: Met  10/15/2024 1015 by Dasia Rankin RN  Outcome:  Progressing  Goal: Free from acute confusion related to pain meds throughout the shift  10/15/2024 1344 by Dasia Rankin RN  Outcome: Met  10/15/2024 1015 by Dasia Rankin RN  Outcome: Progressing     Problem: Skin  Goal: Decreased wound size/increased tissue granulation at next dressing change  10/15/2024 1344 by Dasia Rankin RN  Outcome: Met  10/15/2024 1026 by Dasia Rankin RN  Flowsheets (Taken 10/13/2024 0714 by Taran Mohr RN)  Decreased wound size/increased tissue granulation at next dressing change: Promote sleep for wound healing  10/15/2024 1015 by Dasia Rankin RN  Outcome: Progressing  Goal: Participates in plan/prevention/treatment measures  10/15/2024 1344 by Dasia Rankin RN  Outcome: Met  10/15/2024 1026 by Dasia Rankin RN  Flowsheets (Taken 10/13/2024 0714 by Taran Mohr RN)  Participates in plan/prevention/treatment measures: Discuss with provider PT/OT consult  10/15/2024 1015 by Dasia Rankin RN  Outcome: Progressing  Goal: Prevent/manage excess moisture  10/15/2024 1344 by Dasia Rankin RN  Outcome: Met  10/15/2024 1026 by Dasia Rankin RN  Flowsheets (Taken 10/15/2024 1026)  Prevent/manage excess moisture: Moisturize dry skin  10/15/2024 1015 by Dasia Rankin RN  Outcome: Progressing  Goal: Prevent/minimize sheer/friction injuries  10/15/2024 1344 by Dasia Rankin RN  Outcome: Met  10/15/2024 1026 by Dasia Rankin RN  Flowsheets (Taken 10/13/2024 0714 by Taran Mohr RN)  Prevent/minimize sheer/friction injuries: Turn/reposition every 2 hours/use positioning/transfer devices  10/15/2024 1015 by Dasia Rankin RN  Outcome: Progressing  Goal: Promote/optimize nutrition  10/15/2024 1344 by Dasia Rankin RN  Outcome: Met  10/15/2024 1026 by Dasia Rankin RN  Flowsheets (Taken 10/13/2024 0714 by Taran Mohr RN)  Promote/optimize nutrition: Offer water/supplements/favorite foods  10/15/2024 1015 by Dasia Chucho, RN  Outcome: Progressing  Goal: Promote skin healing  10/15/2024  1344 by Dasia Rankin RN  Outcome: Met  10/15/2024 1026 by Dasia Rankin RN  Flowsheets (Taken 10/13/2024 0714 by Taran Mohr RN)  Promote skin healing: Protective dressings over bony prominences  10/15/2024 1015 by Dasia Rankin RN  Outcome: Progressing      Initial (On Arrival)